# Patient Record
(demographics unavailable — no encounter records)

---

## 2024-10-16 NOTE — REVIEW OF SYSTEMS
[Chills] : chills [Fatigue] : fatigue [Recent Change In Weight] : ~T recent weight change [Abdominal Pain] : abdominal pain [Vomiting] : no vomiting [Constipation] : constipation [Diarrhea: Grade 0] : Diarrhea: Grade 0 [Dysmenorrhea/Abn Vaginal Bleeding] : dysmenorrhea/abnormal vaginal bleeding [Joint Pain] : joint pain [Joint Stiffness] : joint stiffness [Confused] : no confusion [Dizziness] : dizziness [Fainting] : fainting [Difficulty Walking] : no difficulty walking [Negative] : Allergic/Immunologic [de-identified] : + cold intolerance.

## 2024-10-16 NOTE — REVIEW OF SYSTEMS
[Chills] : chills [Fatigue] : fatigue [Recent Change In Weight] : ~T recent weight change [Abdominal Pain] : abdominal pain [Vomiting] : no vomiting [Constipation] : constipation [Diarrhea: Grade 0] : Diarrhea: Grade 0 [Dysmenorrhea/Abn Vaginal Bleeding] : dysmenorrhea/abnormal vaginal bleeding [Joint Pain] : joint pain [Joint Stiffness] : joint stiffness [Confused] : no confusion [Dizziness] : dizziness [Fainting] : fainting [Difficulty Walking] : no difficulty walking [Negative] : Allergic/Immunologic [de-identified] : + cold intolerance.

## 2024-10-21 NOTE — HISTORY OF PRESENT ILLNESS
[de-identified] : Jasmyn Gomez is a 45 year old female here for follow up of anemia.   initial hematology consult 6/19/24.  severe anemia.  Multifactorial - advanced CKD, multiple other chronic illnesses, iron deficiency. Previous PRBC transfusions in 2022, 2023, 2024. IV iron (feraheme) 7/2024. pt did not respond well to feraheme - pre-treatment hgb was 6.9, and peak was 8.1. refuses to take oral iron due to chronic constipation.  ROS + hx of menorrhagia.  Menses last 10 days.  She has heavy bleeding and wears incontinence pullups instead of pads and tampons.  She does not regularly see GYN.  She had a LEEP for CIN3 in 2022 and has not had any gyn f/u since then.  she states "I would rather not know if I have cancer."  She reports chronic constipation.  She has never had EGD or colonoscopy.  Was scheduled in Summer 2024 but she no showed.  PMH, PSH - reviewed FMH - not contributory SH - smoker.  unemployed.  Lives in [place].   [de-identified] : here for f/u.  Reports continued fatigue and cold intolerance but not as severe as in the summer. felt better after IV iron. went to [facility] last week for transplant eval.  hgb was 7.6.  They told her to f/u with hematology. she reports continued regular menses but flow is not as heavy as it was a few months ago. She is not taking any PO iron or vitamins. She is not taking any of her BP meds.  She says she has white coat HTN.  she says she checks BP at home and it is normal.  BP is elevated today but she symptoms of this. She denies blood in stool, urine.  denies dysphagia. wt stable compared to last visit to this office 6-19-24

## 2024-10-21 NOTE — PHYSICAL EXAM
[Normal] : affect appropriate [de-identified] : supple [de-identified] : normal RR, breathing pattern [de-identified] : normal HR [de-identified] : not distended [de-identified] : pallor

## 2024-10-21 NOTE — HISTORY OF PRESENT ILLNESS
[de-identified] : Jasmyn Gomez is a 45 year old female here for follow up of anemia.   initial hematology consult 6/19/24.  severe anemia.  Multifactorial - advanced CKD, multiple other chronic illnesses, iron deficiency. Previous PRBC transfusions in 2022, 2023, 2024. IV iron (feraheme) 7/2024. pt did not respond well to feraheme - pre-treatment hgb was 6.9, and peak was 8.1. refuses to take oral iron due to chronic constipation.  ROS + hx of menorrhagia.  Menses last 10 days.  She has heavy bleeding and wears incontinence pullups instead of pads and tampons.  She does not regularly see GYN.  She had a LEEP for CIN3 in 2022 and has not had any gyn f/u since then.  she states "I would rather not know if I have cancer."  She reports chronic constipation.  She has never had EGD or colonoscopy.  Was scheduled in Summer 2024 but she no showed.  PMH, PSH - reviewed FMH - not contributory SH - smoker.  unemployed.  Lives in [place].   [de-identified] : here for f/u.  Reports continued fatigue and cold intolerance but not as severe as in the summer. felt better after IV iron. went to [facility] last week for transplant eval.  hgb was 7.6.  They told her to f/u with hematology. she reports continued regular menses but flow is not as heavy as it was a few months ago. She is not taking any PO iron or vitamins. She is not taking any of her BP meds.  She says she has white coat HTN.  she says she checks BP at home and it is normal.  BP is elevated today but she symptoms of this. She denies blood in stool, urine.  denies dysphagia. wt stable compared to last visit to this office 6-19-24

## 2024-10-21 NOTE — PHYSICAL EXAM
[Normal] : affect appropriate [de-identified] : supple [de-identified] : normal RR, breathing pattern [de-identified] : normal HR [de-identified] : not distended [de-identified] : pallor

## 2024-10-21 NOTE — ASSESSMENT
[FreeTextEntry1] : Jasmyn Gomez is a 45 year old female with chronic severe anemia.  Has hx iron deficiency, treated with IV iron in July 2024 (feraheme).  Also has advanced CKD which contributes to the anemia.  Plan: #.  normocytic anemia --she finally presents for f/u after several missed appts.  Repeat labs show hgb 7.4.  Normal MCV. --we discussed re-treatment with IV iron if labs demonstrate deficiency.  She previously tolerated feraheme and agrees to IV iron if indicated. --we discussed MILAN's given that she has advanced CKD.  last creatinine on file was 2.95.  MILAN's are indicated if iron stores are normal.  Risks and side effects discussed and literature provided for her to take home to review. --other workup has included:  B12/folate WNL in June 2024;  SPEP negative for M spike 2020, K/L light chain ratio normal (creatinine was 0.9 at that time).  Considering the changes that have occurred w/ her labs, would repeat with next set of labs, to make sure no interval development of paraproteinemia.  #  hx iron deficiency --etiology of  LADONNA ??  she has hx menorrhagia.  hx of LEEP for CIN3 without GYN f/u.  Strongly advised GYN follow up.  She was referred by PCP but no showed. --should also have EGD and colonoscopy.  discussed concern for malignancy as cause of LADONNA, or identification of pre-malignant causes such as H pylori and polyps that are treatable w/ endoscopic management.  Was referred to GI and EGD + colonoscopy scheduled  but she no showed.  Multiple missed appts and no-shows for care are a major barrier to her care.  #  HTN --BP very elevated today.  She says she has not been taking antihypertensives because when she checks BP at home it is normal.  defer to PCP/nephrologist.     RTC 2 months CBC, ferritin, iron panel, immunoelectrophoresis serum (unless recently checked.  ADDENDUM: phoned pt day after her appt.  Iron stores replete;  therefore more IV iron is not indicated.  She should begin treatment w/ MILAN.  Suggest retacrit 10,000 units weekly - can titrate dose and interval for therapy based on response.  Goal hgb approximately 10.  she agrees to start therapy, orders written for [facility].

## 2024-10-25 NOTE — PHYSICAL EXAM
[No Acute Distress] : no acute distress [Well Nourished] : well nourished [Well Developed] : well developed [Well-Appearing] : well-appearing [Normal Sclera/Conjunctiva] : normal sclera/conjunctiva [PERRL] : pupils equal round and reactive to light [EOMI] : extraocular movements intact [Normal Outer Ear/Nose] : the outer ears and nose were normal in appearance [Normal Oropharynx] : the oropharynx was normal [No JVD] : no jugular venous distention [No Lymphadenopathy] : no lymphadenopathy [Supple] : supple [Thyroid Normal, No Nodules] : the thyroid was normal and there were no nodules present [No Respiratory Distress] : no respiratory distress  [No Accessory Muscle Use] : no accessory muscle use [Clear to Auscultation] : lungs were clear to auscultation bilaterally [Normal Rate] : normal rate  [Regular Rhythm] : with a regular rhythm [Normal S1, S2] : normal S1 and S2 [No Murmur] : no murmur heard [No Carotid Bruits] : no carotid bruits [No Abdominal Bruit] : a ~M bruit was not heard ~T in the abdomen [No Varicosities] : no varicosities [Pedal Pulses Present] : the pedal pulses are present [No Edema] : there was no peripheral edema [No Palpable Aorta] : no palpable aorta [No Extremity Clubbing/Cyanosis] : no extremity clubbing/cyanosis [Soft] : abdomen soft [Non Tender] : non-tender [Non-distended] : non-distended [No Masses] : no abdominal mass palpated [No HSM] : no HSM [Normal Bowel Sounds] : normal bowel sounds [Normal Posterior Cervical Nodes] : no posterior cervical lymphadenopathy [Normal Anterior Cervical Nodes] : no anterior cervical lymphadenopathy [No CVA Tenderness] : no CVA  tenderness [No Spinal Tenderness] : no spinal tenderness [No Joint Swelling] : no joint swelling [Grossly Normal Strength/Tone] : grossly normal strength/tone [No Rash] : no rash [Normal Affect] : the affect was normal [Normal Insight/Judgement] : insight and judgment were intact

## 2024-10-28 NOTE — HEALTH RISK ASSESSMENT
[Little interest or pleasure doing things] : 1) Little interest or pleasure doing things [Feeling down, depressed, or hopeless] : 2) Feeling down, depressed, or hopeless [0] : 2) Feeling down, depressed, or hopeless: Not at all (0) [PHQ-9 Negative - No further assessment needed] : PHQ-9 Negative - No further assessment needed [Former] : Former [[id number]] : 0

## 2024-10-28 NOTE — HISTORY OF PRESENT ILLNESS
[FreeTextEntry1] : pt here for follow up. [de-identified] : 45 y.o F w/ PMHx of DM, HTN, CVA, cervical stenosis, anemia and CKD4 presents for follow up. Pt is currently undergoing transplant workup at [facility]. Pt continues to have symptomatic anemia with etiology being secondary to CKD4 as well as extensive menorrhagia. Per pt she is having very heavy menstrual periods that last 10 days. Pt has not been recently evaluated by gyn or GI. From time to time pt has dizziness and lightheadedness however denies that on visit today. Pt scheduled to undergo EPO injections and closely followed by heme/onc. Pt has had 4 blood transfusions in last few months.

## 2024-10-28 NOTE — HISTORY OF PRESENT ILLNESS
[FreeTextEntry1] : pt here for follow up. [de-identified] : 45 y.o F w/ PMHx of DM, HTN, CVA, cervical stenosis, anemia and CKD4 presents for follow up. Pt is currently undergoing transplant workup at [facility]. Pt continues to have symptomatic anemia with etiology being secondary to CKD4 as well as extensive menorrhagia. Per pt she is having very heavy menstrual periods that last 10 days. Pt has not been recently evaluated by gyn or GI. From time to time pt has dizziness and lightheadedness however denies that on visit today. Pt scheduled to undergo EPO injections and closely followed by heme/onc. Pt has had 4 blood transfusions in last few months.

## 2024-10-28 NOTE — HEALTH RISK ASSESSMENT
[Little interest or pleasure doing things] : 1) Little interest or pleasure doing things [Feeling down, depressed, or hopeless] : 2) Feeling down, depressed, or hopeless [0] : 2) Feeling down, depressed, or hopeless: Not at all (0) [PHQ-9 Negative - No further assessment needed] : PHQ-9 Negative - No further assessment needed [Former] : Former [[id number]] : 0

## 2024-10-28 NOTE — ASSESSMENT
[FreeTextEntry1] : 45 y.o F w/ PMHx of DM, HTN, CVA, cervical stenosis, anemia and CKD4 presents for follow up.  # Anemia Pt with anemia likely 2/2 menorrhagia blood loss and CKD4. Never evaluated by GYN or GI. Pt undergoing EPO injections weekly. On ASA 81 due to hx of stroke.  - GYN and GI referral sent - has risk/bnenefit discussion regarding continued use of ASA  - further management with heme/onc Dr. Christine   # HTN Pt with labile blood pressure. Elevated in office but also very low to 70s at home. Was previously on midodrine. Currently takes valsartan 40 mg qd but does not take lasix 40 although ordered for it - Pt asked to keep BP log and come back in a week for titration of BP meds  # Neuroapthic pain Gabapentin 100 BID ordered (renally dosed)  # HCM Dispo: Return in 1 week for reeval and med optimization.
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
## 2024-10-29 NOTE — REASON FOR VISIT
[FreeTextEntry1] : ======================================================================================= Diagnostic Tests: -------------------------------------------------------------- ECG: 10/29/24: sinus rhythm, PRWP.   10/16/20: NSR, normal ECG.  -------------------------------------------------------------- CT: 11/05/20: CTA chest: CA+ score 14 (LM 0, LAD 14, LCX 0, RCA 0), LAD mid minimal and otherwise normal. -------------------------------------------------------------- Echo: 11/24/20: EF 60%, normal study.   -------------------------------------------------------------- MRI: 03/09/23: MRA brain: no CVA, no evidence of significant stenosis or occlusion within the extracranial circulation.
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
## 2024-10-29 NOTE — PHYSICAL EXAM
[FreeTextEntry1] : maculopapular rash on all extremities [S3] : no S3 [S4] : no S4 [Right Carotid Bruit] : no bruit heard over the right carotid [Left Carotid Bruit] : no bruit heard over the left carotid [Right Femoral Bruit] : no bruit heard over the right femoral artery [Left Femoral Bruit] : no bruit heard over the left femoral artery

## 2024-10-29 NOTE — ASSESSMENT
[FreeTextEntry1] : ======================================================================================= 1. DM2: poorly controlled in the past: A1c 6.0% (08/19/24):       - discussed with patient therapeutic lifestyle changes to improve glucose metabolism      - continue insulin       - continue Ozempic 0.5mg sc q week       - follow up with PMD and endocrinologist, Stone Mistry MD,   2. 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 CAD, mild: s/p 11/05/20: CTA chest: CA+ score 14 (LM 0, LAD 14, LCX 0, RCA 0), LAD mid minimal otherwise normal:        - will pursue conservative management at this time    4. Lower extremity edema: s/p negative work up for possible cardiac etiology: secondary to nephrotic syndrome:        - continue furosemide 40mg po qd prn edema (will use sparingly given occasional orthostatic hypotension)   5. CKD IV: diabetic nephropathy with nephrotic range proteinuria: Cr. 2.84, eGFR 20 (10/14/24):         - follow up with nephrologist, Brandi Cook MD       - continue tight glycemic control       - on kidney transplant list at [facility]  6. Anemia: secondary to menorrhagia and CKD IV:  Hgb 7.4, MCV 88.8 (10/17/24):        - follow up with hematologist, Saray Christine MD  7. s/p 5-toe amputation left foot (07/23/21):        - follow up with podiatrist, Dr. Corea         - will send for a lower extremity arterial sonogram to r/o PAD  8. HTN: BP not at ACC/AHA 2017 guideline target: markedly elevated in the office, ? component of "white coat" HTN:        - change valsartan 40mg po qd to vaslartan//25mg po qd (possible adverse effects of new medications discussed)        - will maintain a home blood pressure log for my review        - she will bring in her ambulatory BP device for correlation with office device        - if BP remains above target next visit will up titrate anti-HTN regimen

## 2024-10-29 NOTE — ASSESSMENT
[FreeTextEntry1] : ======================================================================================= 1. DM2: poorly controlled in the past: A1c 6.0% (08/19/24):       - discussed with patient therapeutic lifestyle changes to improve glucose metabolism      - continue insulin       - continue Ozempic 0.5mg sc q week       - follow up with PMD and endocrinologist, Stone Mistry MD,   2. 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 CAD, mild: s/p 11/05/20: CTA chest: CA+ score 14 (LM 0, LAD 14, LCX 0, RCA 0), LAD mid minimal otherwise normal:        - will pursue conservative management at this time    4. Lower extremity edema: s/p negative work up for possible cardiac etiology: secondary to nephrotic syndrome:        - continue furosemide 40mg po qd prn edema (will use sparingly given occasional orthostatic hypotension)   5. CKD IV: diabetic nephropathy with nephrotic range proteinuria: Cr. 2.84, eGFR 20 (10/14/24):         - follow up with nephrologist, Brandi Cook MD       - continue tight glycemic control       - on kidney transplant list at [facility]  6. Anemia: secondary to menorrhagia and CKD IV:  Hgb 7.4, MCV 88.8 (10/17/24):        - follow up with hematologist, Saray Christine MD  7. s/p 5-toe amputation left foot (07/23/21):        - follow up with podiatrist, Dr. Corea         - will send for a lower extremity arterial sonogram to r/o PAD  8. HTN: BP not at ACC/AHA 2017 guideline target: markedly elevated in the office, ? component of "white coat" HTN:        - change valsartan 40mg po qd to vaslartan//25mg po qd (possible adverse effects of new medications discussed)        - will maintain a home blood pressure log for my review        - she will bring in her ambulatory BP device for correlation with office device        - if BP remains above target next visit will up titrate anti-HTN regimen

## 2024-12-16 NOTE — REVIEW OF SYSTEMS
[Fever] : fever [Vomiting] : vomiting [Feeling Poorly] : feeling poorly [Feeling Tired] : feeling tired [Chills] : no chills [Heart Rate Is Slow] : the heart rate was not slow [Heart Rate Is Fast] : the heart rate was not fast [Chest Pain] : no chest pain [Palpitations] : no palpitations [Shortness Of Breath] : no shortness of breath [Wheezing] : no wheezing [Cough] : no cough [SOB on Exertion] : no shortness of breath during exertion [Abdominal Pain] : no abdominal pain [Constipation] : no constipation [Diarrhea] : no diarrhea [Confused] : no confusion

## 2024-12-16 NOTE — HISTORY OF PRESENT ILLNESS
[FreeTextEntry1] : Reason for visit: CKD  HPI She is 46 y/o F with medical history of Type II DM c/b retinopathy, neuropathy and advanced CKD. She also had CVA (thrombosis of right middle cerebral artery) with R sided weakness s/p TPA at [facility]. She is here today for follow up visit for CKD.   In terms with her renal history, she has had rather marked progression of her renal disease. Her CR in 2021 was 0.9. No labs till Feb of 2024 when CR was noted to be 1.8. Then there an abrupt rise in March to 2.9. stayed the same in Aug and then on Dec 6th noted to be 3.6. Renal US with R kidney 11.5 cm and L kidney 12.7 cm.   She continues to have nausea and vomiting. Appetite has reduced. Now with metallic taste in mouth as well. All seems to be uremic symptoms. I went over type of dialysis modalities with her today and gave her the [facility] website for further resources. She is listed at [facility] for renal transplant (since Sept 2024).

## 2024-12-16 NOTE — PHYSICAL EXAM
[General Appearance - Alert] : alert [General Appearance - In No Acute Distress] : in no acute distress [Neck Appearance] : the appearance of the neck was normal [Apical Impulse] : the apical impulse was normal [Heart Rate And Rhythm] : heart rate was normal and rhythm regular [Heart Sounds] : normal S1 and S2 [Heart Sounds Gallop] : no gallops [Edema] : there was no peripheral edema [Bowel Sounds] : normal bowel sounds [Abdomen Soft] : soft [Abdomen Tenderness] : non-tender [No Focal Deficits] : no focal deficits [Oriented To Time, Place, And Person] : oriented to person, place, and time [Impaired Insight] : insight and judgment were intact

## 2024-12-16 NOTE — ASSESSMENT
[FreeTextEntry1] : #SEGUNDO on CKD stage IV -> SEGUNDO v/s progression of renal disease. She is exhibiting some mild uremic symptoms.  -> Will repeat labs today. Went over dialysis modalities. Further referral based on modalities that she chooses.  -> She is listed for renal transplant at [facility] -> Stop Valsartan and HCTZ. Start Nifedipine 30mg daily and will titrate up as needed.  -> Med adjustment: Gabapentin 100mg once per day.  -> Went over the symptoms of renal failure that will need emergent ED visit eg anuria, confusion, worsening of nausea/vomiting.   #Volume/HTN -> On Lasix 40mg daily. -> Switch Valsartan to nifedipine. Likely will need higher dose.   #Metabolic acidosis -> On Na bicarb two tabs TID  #Anemia of CKD -> Started on EPO by hematology  #Hyperkalemia -> On Lokelma daily.   Labs today. RTC in 4 weeks.

## 2024-12-21 NOTE — REASON FOR VISIT
[Other: ____] : [unfilled] [FreeTextEntry1] : ======================================================================================= Diagnostic Tests: -------------------------------------------------------------- ECG: 10/29/24: sinus rhythm, PRWP.   10/16/20: NSR, normal ECG.  -------------------------------------------------------------- CT: 11/05/20: CTA chest: CA+ score 14 (LM 0, LAD 14, LCX 0, RCA 0), LAD mid minimal and otherwise normal. -------------------------------------------------------------- Echo: 11/24/20: EF 60%, normal study.   -------------------------------------------------------------- MRI: 03/09/23: MRA brain: no CVA, no evidence of significant stenosis or occlusion within the extracranial circulation.
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
## 2024-12-21 NOTE — PHYSICAL EXAM
[General Appearance - Well Developed] : well developed [Normal Appearance] : normal appearance [Well Groomed] : well groomed [General Appearance - Well Nourished] : well nourished [No Deformities] : no deformities [General Appearance - In No Acute Distress] : no acute distress [Normal Conjunctiva] : the conjunctiva exhibited no abnormalities [Eyelids - No Xanthelasma] : the eyelids demonstrated no xanthelasmas [Normal Oral Mucosa] : normal oral mucosa [No Oral Pallor] : no oral pallor [No Oral Cyanosis] : no oral cyanosis [Normal Jugular Venous A Waves Present] : normal jugular venous A waves present [Normal Jugular Venous V Waves Present] : normal jugular venous V waves present [No Jugular Venous Travis A Waves] : no jugular venous travis A waves [Respiration, Rhythm And Depth] : normal respiratory rhythm and effort [Exaggerated Use Of Accessory Muscles For Inspiration] : no accessory muscle use [Auscultation Breath Sounds / Voice Sounds] : lungs were clear to auscultation bilaterally [Bowel Sounds] : normal bowel sounds [Abdomen Soft] : soft [Abdomen Tenderness] : non-tender [Abdomen Mass (___ Cm)] : no abdominal mass palpated [Abnormal Walk] : normal gait [Gait - Sufficient For Exercise Testing] : the gait was sufficient for exercise testing [Nail Clubbing] : no clubbing of the fingernails [Cyanosis, Localized] : no localized cyanosis [Petechial Hemorrhages (___cm)] : no petechial hemorrhages [] : no ischemic changes [Skin Color & Pigmentation] : normal skin color and pigmentation [No Venous Stasis] : no venous stasis [Skin Lesions] : no skin lesions [No Skin Ulcers] : no skin ulcer [No Xanthoma] : no  xanthoma was observed [FreeTextEntry1] : maculopapular rash on all extremities [Oriented To Time, Place, And Person] : oriented to person, place, and time [Affect] : the affect was normal [Mood] : the mood was normal [No Anxiety] : not feeling anxious [Normal Rate] : normal [Normal S1] : normal S1 [Normal S2] : normal S2 [S3] : no S3 [S4] : no S4 [No Murmur] : no murmurs heard [Right Carotid Bruit] : no bruit heard over the right carotid [Left Carotid Bruit] : no bruit heard over the left carotid [Right Femoral Bruit] : no bruit heard over the right femoral artery [Left Femoral Bruit] : no bruit heard over the left femoral artery [2+] : left 2+ [No Abnormalities] : the abdominal aorta was not enlarged and no bruit was heard [Nonpitting Edema] : nonpitting edema present

## 2024-12-21 NOTE — ASSESSMENT
[FreeTextEntry1] : ======================================================================================= 1. DM2: poorly controlled in the past: A1c 6.0% (08/19/24):       - discussed with patient therapeutic lifestyle changes to improve glucose metabolism      - continue insulin       - continue Ozempic 0.5mg sc q week       - follow up with PMD and endocrinologist, Stone Mistry MD,   2. 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 CAD, mild: s/p 11/05/20: CTA chest: CA+ score 14 (LM 0, LAD 14, LCX 0, RCA 0), LAD mid minimal otherwise normal:        - will pursue conservative management at this time    4. Lower extremity edema: s/p negative work up for possible cardiac etiology: secondary to nephrotic syndrome:        - continue furosemide 40mg po qd prn edema (will use sparingly given occasional orthostatic hypotension)   5. CKD V: diabetic nephropathy with nephrotic range proteinuria: Cr. 4.76, eGFR 11 (12/16/24):        - follow up with nephrologist, Brandi Cook MD       - continue tight glycemic control       - on kidney transplant list at [facility]  6. Anemia: secondary to menorrhagia and CKD IV:  Hgb 6.6 (12/16/24):         - follow up with hematologist, Saray Christine MD  7. s/p 5-toe amputation left foot (07/23/21):        - follow up with podiatrist, Dr. Corea         - will send for a lower extremity arterial sonogram to r/o PAD  8. HTN: BP not at ACC/AHA 2017 guideline target: markedly elevated in the office, ? component of "white coat" HTN:        - change valsartan 40mg po qd to vaslartan//25mg po qd (possible adverse effects of new medications discussed)        - will maintain a home blood pressure log for my review        - she will bring in her ambulatory BP device for correlation with office device        - if BP remains above target next visit will up titrate anti-HTN regimen